# Patient Record
Sex: MALE | Race: WHITE | NOT HISPANIC OR LATINO | Employment: FULL TIME | URBAN - NONMETROPOLITAN AREA
[De-identification: names, ages, dates, MRNs, and addresses within clinical notes are randomized per-mention and may not be internally consistent; named-entity substitution may affect disease eponyms.]

---

## 2021-09-06 ENCOUNTER — OFFICE VISIT (OUTPATIENT)
Dept: URGENT CARE | Facility: CLINIC | Age: 56
End: 2021-09-06
Payer: COMMERCIAL

## 2021-09-06 VITALS
TEMPERATURE: 97.7 F | WEIGHT: 237.5 LBS | BODY MASS INDEX: 29.53 KG/M2 | HEIGHT: 75 IN | OXYGEN SATURATION: 96 % | RESPIRATION RATE: 16 BRPM | SYSTOLIC BLOOD PRESSURE: 122 MMHG | DIASTOLIC BLOOD PRESSURE: 84 MMHG | HEART RATE: 72 BPM

## 2021-09-06 DIAGNOSIS — L02.415: ICD-10-CM

## 2021-09-06 PROBLEM — R31.29 MICROSCOPIC HEMATURIA: Status: ACTIVE | Noted: 2021-09-06

## 2021-09-06 PROBLEM — M77.30 CALCANEAL SPUR: Status: ACTIVE | Noted: 2021-09-06

## 2021-09-06 PROBLEM — M79.609 PAIN IN SOFT TISSUES OF LIMB: Status: ACTIVE | Noted: 2018-02-19

## 2021-09-06 PROCEDURE — 10060 I&D ABSCESS SIMPLE/SINGLE: CPT | Performed by: STUDENT IN AN ORGANIZED HEALTH CARE EDUCATION/TRAINING PROGRAM

## 2021-09-06 RX ORDER — SULFAMETHOXAZOLE AND TRIMETHOPRIM 800; 160 MG/1; MG/1
1 TABLET ORAL 2 TIMES DAILY
Qty: 10 TABLET | Refills: 0 | Status: SHIPPED | OUTPATIENT
Start: 2021-09-06 | End: 2021-09-11
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
## 2024-01-25 ENCOUNTER — HOSPITAL ENCOUNTER (OUTPATIENT)
Dept: RADIOLOGY | Facility: MEDICAL CENTER | Age: 59
End: 2024-01-25
Attending: PHYSICIAN ASSISTANT
Payer: COMMERCIAL

## 2024-01-25 DIAGNOSIS — M54.50 ACUTE MIDLINE LOW BACK PAIN, UNSPECIFIED WHETHER SCIATICA PRESENT: ICD-10-CM

## 2024-01-25 PROCEDURE — 72192 CT PELVIS W/O DYE: CPT
